# Patient Record
Sex: FEMALE | Race: WHITE | URBAN - METROPOLITAN AREA
[De-identification: names, ages, dates, MRNs, and addresses within clinical notes are randomized per-mention and may not be internally consistent; named-entity substitution may affect disease eponyms.]

---

## 2018-08-22 ENCOUNTER — DOCTOR'S OFFICE (OUTPATIENT)
Dept: URBAN - METROPOLITAN AREA CLINIC 137 | Facility: CLINIC | Age: 58
Setting detail: OPHTHALMOLOGY
End: 2018-08-22
Payer: COMMERCIAL

## 2018-08-22 ENCOUNTER — OPTICAL OFFICE (OUTPATIENT)
Dept: URBAN - METROPOLITAN AREA CLINIC 146 | Facility: CLINIC | Age: 58
Setting detail: OPHTHALMOLOGY
End: 2018-08-22
Payer: COMMERCIAL

## 2018-08-22 ENCOUNTER — RX ONLY (RX ONLY)
Age: 58
End: 2018-08-22

## 2018-08-22 DIAGNOSIS — H52.223: ICD-10-CM

## 2018-08-22 DIAGNOSIS — H52.4: ICD-10-CM

## 2018-08-22 PROCEDURE — V2781 PROGRESSIVE LENS PER LENS: HCPCS | Performed by: OPTOMETRIST

## 2018-08-22 PROCEDURE — V2020 VISION SVCS FRAMES PURCHASES: HCPCS | Performed by: OPTOMETRIST

## 2018-08-22 PROCEDURE — 92015 DETERMINE REFRACTIVE STATE: CPT | Performed by: OPTOMETRIST

## 2018-08-22 PROCEDURE — 92004 COMPRE OPH EXAM NEW PT 1/>: CPT | Performed by: OPTOMETRIST

## 2018-08-22 PROCEDURE — V2744 TINT PHOTOCHROMATIC LENS/ES: HCPCS | Performed by: OPTOMETRIST

## 2018-08-22 ASSESSMENT — REFRACTION_OUTSIDERX
OS_VA3: 20/
OS_VA2: 20/
OS_ADD: +2.25
OS_SPHERE: PLANO
OD_VA2: 20/
OS_VA1: 20/30+
OS_AXIS: 040
OD_SPHERE: PLANO
OD_AXIS: 005
OD_ADD: +2.25
OU_VA: 20/20
OD_CYLINDER: -1.00
OD_VA3: 20/
OD_VA1: 20/20
OS_CYLINDER: -2.00

## 2018-08-22 ASSESSMENT — REFRACTION_CURRENTRX
OD_OVR_VA: 20/
OS_OVR_VA: 20/
OD_OVR_VA: 20/
OS_OVR_VA: 20/
OD_OVR_VA: 20/
OS_OVR_VA: 20/

## 2018-08-22 ASSESSMENT — REFRACTION_AUTOREFRACTION
OS_CYLINDER: +2.00
OD_CYLINDER: +1.25
OS_SPHERE: -0.25
OD_AXIS: 14
OS_AXIS: 41
OD_SPHERE: -0.25

## 2018-08-22 ASSESSMENT — REFRACTION_MANIFEST
OD_VA3: 20/
OS_VA1: 20/
OD_VA1: 20/
OS_VA2: 20/
OS_VA3: 20/
OS_VA2: 20/
OD_VA1: 20/
OD_VA2: 20/
OS_VA1: 20/
OU_VA: 20/
OS_VA3: 20/
OD_VA2: 20/
OD_VA3: 20/
OU_VA: 20/

## 2018-08-22 ASSESSMENT — VISUAL ACUITY
OD_BCVA: 20/50+2
OS_BCVA: 20/30-2

## 2018-08-22 ASSESSMENT — INCREASING TEAR LAKE - SEVERITY SCORE
OS_INC_TEARLAKE: 1+
OD_INC_TEARLAKE: 1+

## 2018-08-22 ASSESSMENT — SUPERFICIAL PUNCTATE KERATITIS (SPK)
OS_SPK: T
OD_SPK: T

## 2018-08-22 ASSESSMENT — CONFRONTATIONAL VISUAL FIELD TEST (CVF)
OD_FINDINGS: FULL
OS_FINDINGS: FULL

## 2018-08-22 ASSESSMENT — SPHEQUIV_DERIVED
OS_SPHEQUIV: 0.75
OD_SPHEQUIV: 0.375

## 2018-08-22 ASSESSMENT — TEAR BREAK UP TIME (TBUT)
OD_TBUT: 2+
OS_TBUT: 2+

## 2018-09-12 ENCOUNTER — DOCTOR'S OFFICE (OUTPATIENT)
Dept: URBAN - METROPOLITAN AREA CLINIC 137 | Facility: CLINIC | Age: 58
Setting detail: OPHTHALMOLOGY
End: 2018-09-12
Payer: MEDICARE

## 2018-09-12 DIAGNOSIS — H52.4: ICD-10-CM

## 2018-09-12 PROCEDURE — NO CHARGE N/C PROFESSIONAL COURTESY: Performed by: OPTOMETRIST

## 2018-09-12 ASSESSMENT — VISUAL ACUITY
OS_BCVA: 20/30-2
OD_BCVA: 20/40+2

## 2018-09-12 ASSESSMENT — REFRACTION_CURRENTRX
OD_SPHERE: -0.75
OD_OVR_VA: 20/
OS_AXIS: 132
OD_CYLINDER: +1.00
OS_CYLINDER: +1.75
OS_OVR_VA: 20/
OD_OVR_VA: 20/
OS_OVR_VA: 20/
OS_OVR_VA: 20/
OS_SPHERE: -1.50
OD_AXIS: 107
OD_OVR_VA: 20/

## 2018-09-12 ASSESSMENT — REFRACTION_MANIFEST
OS_AXIS: 045
OS_VA1: 20/20
OU_VA: 20/20
OD_SPHERE: -0.75
OD_VA2: 20/
OS_SPHERE: -1.25
OS_VA3: 20/
OS_VA2: 20/
OS_VA2: 20/
OU_VA: 20/
OD_VA1: 20/20
OS_VA3: 20/
OD_VA3: 20/
OS_VA1: 20/
OD_CYLINDER: +0.75
OD_AXIS: 045
OS_ADD: +2.25
OD_VA3: 20/
OD_VA1: 20/
OS_CYLINDER: +1.75
OD_ADD: +2.25
OD_VA2: 20/

## 2018-09-12 ASSESSMENT — REFRACTION_AUTOREFRACTION
OS_CYLINDER: +2.00
OS_SPHERE: -0.25
OD_SPHERE: -0.25
OS_AXIS: 41
OD_AXIS: 14
OD_CYLINDER: +1.25

## 2018-09-12 ASSESSMENT — SPHEQUIV_DERIVED
OD_SPHEQUIV: -0.375
OD_SPHEQUIV: 0.375
OS_SPHEQUIV: 0.75
OS_SPHEQUIV: -0.375

## 2018-09-12 ASSESSMENT — CONFRONTATIONAL VISUAL FIELD TEST (CVF)
OD_FINDINGS: FULL
OS_FINDINGS: FULL

## 2018-12-07 ENCOUNTER — DOCTOR'S OFFICE (OUTPATIENT)
Dept: URBAN - METROPOLITAN AREA CLINIC 137 | Facility: CLINIC | Age: 58
Setting detail: OPHTHALMOLOGY
End: 2018-12-07

## 2018-12-07 DIAGNOSIS — H52.4: ICD-10-CM

## 2018-12-07 PROCEDURE — NCRX N/C GLASSES RX: Performed by: OPTOMETRIST

## 2018-12-07 ASSESSMENT — REFRACTION_MANIFEST
OU_VA: 20/
OS_AXIS: 135
OD_ADD: +2.25
OD_VA2: 20/
OU_VA: 20/20
OD_AXIS: 135
OS_ADD: +2.25
OD_VA1: 20/
OS_CYLINDER: -1.75
OD_VA2: 20/
OS_VA1: 20/
OD_CYLINDER: -0.75
OS_VA3: 20/
OS_VA2: 20/
OS_VA1: 20/20
OD_VA1: 20/20
OD_SPHERE: PLANO
OS_VA3: 20/
OS_VA2: 20/
OD_VA3: 20/
OS_SPHERE: +0.50
OD_VA3: 20/

## 2018-12-07 ASSESSMENT — REFRACTION_CURRENTRX
OD_OVR_VA: 20/
OD_AXIS: 017
OS_CYLINDER: -1.75
OD_CYLINDER: -1.00
OD_OVR_VA: 20/
OD_SPHERE: +0.25
OS_OVR_VA: 20/
OS_OVR_VA: 20/
OD_OVR_VA: 20/
OS_OVR_VA: 20/
OS_AXIS: 042
OS_SPHERE: +0.25

## 2018-12-07 ASSESSMENT — SPHEQUIV_DERIVED
OS_SPHEQUIV: 0.125
OD_SPHEQUIV: -0.625
OS_SPHEQUIV: -0.375

## 2018-12-07 ASSESSMENT — REFRACTION_AUTOREFRACTION
OS_CYLINDER: -1.25
OD_SPHERE: -0.25
OD_AXIS: 144
OD_CYLINDER: -0.75
OS_AXIS: 119
OS_SPHERE: +0.75

## 2018-12-07 ASSESSMENT — VISUAL ACUITY
OD_BCVA: 20/30-1
OS_BCVA: 20/20-2

## 2019-01-09 ENCOUNTER — DOCTOR'S OFFICE (OUTPATIENT)
Dept: URBAN - METROPOLITAN AREA CLINIC 137 | Facility: CLINIC | Age: 59
Setting detail: OPHTHALMOLOGY
End: 2019-01-09

## 2019-01-09 DIAGNOSIS — H52.223: ICD-10-CM

## 2019-01-09 PROCEDURE — NCRX N/C GLASSES RX: Performed by: OPTOMETRIST

## 2019-01-09 ASSESSMENT — CONFRONTATIONAL VISUAL FIELD TEST (CVF)
OS_FINDINGS: FULL
OD_FINDINGS: FULL

## 2019-01-09 ASSESSMENT — SPHEQUIV_DERIVED
OS_SPHEQUIV: -0.375
OD_SPHEQUIV: -0.625
OS_SPHEQUIV: 0.125

## 2019-01-09 ASSESSMENT — REFRACTION_MANIFEST
OD_VA3: 20/
OS_VA2: 20/
OS_CYLINDER: -1.75
OD_VA3: 20/
OS_VA1: 20/20
OU_VA: 20/
OS_VA1: 20/
OD_ADD: +2.25
OD_CYLINDER: -0.75
OS_VA2: 20/
OS_VA3: 20/
OU_VA: 20/20
OS_AXIS: 135
OD_VA1: 20/20
OD_AXIS: 135
OD_VA1: 20/
OS_SPHERE: +0.50
OD_VA2: 20/
OS_VA3: 20/
OD_SPHERE: PLANO
OS_ADD: +2.25
OD_VA2: 20/

## 2019-01-09 ASSESSMENT — REFRACTION_CURRENTRX
OS_SPHERE: +0.25
OS_OVR_VA: 20/
OD_OVR_VA: 20/
OS_OVR_VA: 20/
OS_OVR_VA: 20/
OD_CYLINDER: -1.00
OS_CYLINDER: -1.75
OD_OVR_VA: 20/
OD_AXIS: 017
OS_AXIS: 042
OD_SPHERE: +0.25
OD_OVR_VA: 20/

## 2019-01-09 ASSESSMENT — REFRACTION_AUTOREFRACTION
OD_SPHERE: -0.25
OS_AXIS: 119
OD_AXIS: 144
OS_SPHERE: +0.75
OD_CYLINDER: -0.75
OS_CYLINDER: -1.25

## 2019-01-09 ASSESSMENT — VISUAL ACUITY
OD_BCVA: 20/25-1
OS_BCVA: 20/20-1

## 2019-03-06 ENCOUNTER — OPTICAL OFFICE (OUTPATIENT)
Dept: URBAN - METROPOLITAN AREA CLINIC 146 | Facility: CLINIC | Age: 59
Setting detail: OPHTHALMOLOGY
End: 2019-03-06
Payer: COMMERCIAL

## 2019-03-06 DIAGNOSIS — H52.223: ICD-10-CM

## 2019-03-06 PROCEDURE — V2762 POLARIZATION, ANY LENS: HCPCS | Performed by: OPTOMETRIST

## 2019-03-06 PROCEDURE — V2020 VISION SVCS FRAMES PURCHASES: HCPCS | Performed by: OPTOMETRIST

## 2019-03-06 PROCEDURE — V2103 SPHEROCYLINDR 4.00D/12-2.00D: HCPCS | Performed by: OPTOMETRIST

## 2020-06-01 ENCOUNTER — TELEPHONE (OUTPATIENT)
Dept: FAMILY MEDICINE CLINIC | Facility: CLINIC | Age: 60
End: 2020-06-01

## 2020-06-01 DIAGNOSIS — Z20.822 EXPOSURE TO COVID-19 VIRUS: Primary | ICD-10-CM

## 2020-06-03 LAB — SARS-COV-2 IGG SERPL QL IA: NEGATIVE

## 2020-08-13 ENCOUNTER — DOCTOR'S OFFICE (OUTPATIENT)
Dept: URBAN - METROPOLITAN AREA CLINIC 137 | Facility: CLINIC | Age: 60
Setting detail: OPHTHALMOLOGY
End: 2020-08-13
Payer: MEDICARE

## 2020-08-13 DIAGNOSIS — H43.811: ICD-10-CM

## 2020-08-13 PROCEDURE — 92014 COMPRE OPH EXAM EST PT 1/>: CPT | Performed by: OPTOMETRIST

## 2020-08-13 ASSESSMENT — REFRACTION_MANIFEST
OS_VA1: 20/20
OD_VA1: 20/20
OU_VA: 20/20
OD_SPHERE: PLANO
OS_AXIS: 135
OS_CYLINDER: -1.75
OD_AXIS: 135
OD_ADD: +2.25
OD_CYLINDER: -0.75
OS_ADD: +2.25
OS_SPHERE: +0.50

## 2020-08-13 ASSESSMENT — TEAR BREAK UP TIME (TBUT)
OS_TBUT: 2+
OD_TBUT: 2+

## 2020-08-13 ASSESSMENT — SUPERFICIAL PUNCTATE KERATITIS (SPK)
OD_SPK: T
OS_SPK: T

## 2020-08-13 ASSESSMENT — REFRACTION_CURRENTRX
OS_SPHERE: +0.25
OD_CYLINDER: -1.00
OD_SPHERE: +0.25
OD_AXIS: 017
OS_CYLINDER: -1.75
OD_OVR_VA: 20/
OS_AXIS: 042
OS_OVR_VA: 20/

## 2020-08-13 ASSESSMENT — SPHEQUIV_DERIVED
OS_SPHEQUIV: -0.375
OD_SPHEQUIV: -0.625
OS_SPHEQUIV: 0.125

## 2020-08-13 ASSESSMENT — CONFRONTATIONAL VISUAL FIELD TEST (CVF)
OD_FINDINGS: FULL
OS_FINDINGS: FULL

## 2020-08-13 ASSESSMENT — VISUAL ACUITY
OS_BCVA: 20/30-1
OD_BCVA: 20/30

## 2020-08-13 ASSESSMENT — REFRACTION_AUTOREFRACTION
OS_SPHERE: +0.75
OD_CYLINDER: -0.75
OS_CYLINDER: -1.25
OD_SPHERE: -0.25
OD_AXIS: 144
OS_AXIS: 119

## 2020-08-13 ASSESSMENT — INCREASING TEAR LAKE - SEVERITY SCORE
OD_INC_TEARLAKE: 1+
OS_INC_TEARLAKE: 1+

## 2020-09-08 ENCOUNTER — OPTICAL OFFICE (OUTPATIENT)
Dept: URBAN - METROPOLITAN AREA CLINIC 146 | Facility: CLINIC | Age: 60
Setting detail: OPHTHALMOLOGY
End: 2020-09-08

## 2020-09-08 ENCOUNTER — DOCTOR'S OFFICE (OUTPATIENT)
Dept: URBAN - METROPOLITAN AREA CLINIC 137 | Facility: CLINIC | Age: 60
Setting detail: OPHTHALMOLOGY
End: 2020-09-08
Payer: COMMERCIAL

## 2020-09-08 DIAGNOSIS — H52.223: ICD-10-CM

## 2020-09-08 DIAGNOSIS — H52.03: ICD-10-CM

## 2020-09-08 PROCEDURE — V2104 SPHEROCYLINDR 4.00D/2.12-4D: HCPCS | Performed by: OPTOMETRIST

## 2020-09-08 PROCEDURE — V2103 SPHEROCYLINDR 4.00D/12-2.00D: HCPCS | Performed by: OPTOMETRIST

## 2020-09-08 PROCEDURE — V2020 VISION SVCS FRAMES PURCHASES: HCPCS | Performed by: OPTOMETRIST

## 2020-09-08 PROCEDURE — V2762 POLARIZATION, ANY LENS: HCPCS | Performed by: OPTOMETRIST

## 2020-09-08 PROCEDURE — 92014 COMPRE OPH EXAM EST PT 1/>: CPT | Performed by: OPTOMETRIST

## 2020-09-08 ASSESSMENT — VISUAL ACUITY
OD_BCVA: 20/30
OS_BCVA: 20/30

## 2020-09-08 ASSESSMENT — REFRACTION_MANIFEST
OD_CYLINDER: -2.25
OD_AXIS: 105
OS_CYLINDER: -1.75
OS_VA1: 20/20
OS_ADD: +2.00
OD_AXIS: 135
OD_ADD: +2.00
OS_CYLINDER: -1.75
OS_ADD: +2.25
OD_ADD: +2.25
OS_AXIS: 135
OU_VA: 20/20
OS_VA1: 20/20
OS_AXIS: 115
OD_CYLINDER: -0.75
OD_SPHERE: +1.25
OD_SPHERE: PLANO
OS_SPHERE: +0.50
OS_SPHERE: +1.25
OD_VA1: 20/20
OD_VA1: 20/20

## 2020-09-08 ASSESSMENT — REFRACTION_CURRENTRX
OS_SPHERE: +0.50
OD_AXIS: 139
OD_SPHERE: SPH
OS_CYLINDER: -2.00
OS_AXIS: 143
OS_OVR_VA: 20/
OD_CYLINDER: -1.00
OD_OVR_VA: 20/

## 2020-09-08 ASSESSMENT — SUPERFICIAL PUNCTATE KERATITIS (SPK)
OS_SPK: T
OD_SPK: T

## 2020-09-08 ASSESSMENT — INCREASING TEAR LAKE - SEVERITY SCORE
OS_INC_TEARLAKE: 1+
OD_INC_TEARLAKE: 1+

## 2020-09-08 ASSESSMENT — SPHEQUIV_DERIVED
OD_SPHEQUIV: 0
OS_SPHEQUIV: 0.25
OS_SPHEQUIV: -0.375
OS_SPHEQUIV: 0.375
OD_SPHEQUIV: 0.125

## 2020-09-08 ASSESSMENT — REFRACTION_AUTOREFRACTION
OS_SPHERE: +1.00
OS_AXIS: 116
OD_AXIS: 106
OD_CYLINDER: -1.00
OS_CYLINDER: -1.50
OD_SPHERE: +0.50

## 2020-09-08 ASSESSMENT — TEAR BREAK UP TIME (TBUT)
OS_TBUT: 2+
OD_TBUT: 2+

## 2020-09-08 ASSESSMENT — CONFRONTATIONAL VISUAL FIELD TEST (CVF)
OD_FINDINGS: FULL
OS_FINDINGS: FULL

## 2020-11-23 ENCOUNTER — DOCTOR'S OFFICE (OUTPATIENT)
Dept: URBAN - METROPOLITAN AREA CLINIC 137 | Facility: CLINIC | Age: 60
Setting detail: OPHTHALMOLOGY
End: 2020-11-23

## 2020-11-23 DIAGNOSIS — H52.223: ICD-10-CM

## 2020-11-23 DIAGNOSIS — H52.03: ICD-10-CM

## 2020-11-23 PROCEDURE — NCRX N/C GLASSES RX: Performed by: OPTOMETRIST

## 2020-11-23 ASSESSMENT — CONFRONTATIONAL VISUAL FIELD TEST (CVF)
OS_FINDINGS: FULL
OD_FINDINGS: FULL

## 2020-11-30 PROBLEM — H43.811 POSTERIOR VITREOUS DETACHMENT; RIGHT EYE: Status: ACTIVE | Noted: 2020-08-13

## 2020-11-30 PROBLEM — H27.8 PSEUDOPHAKIA ; BOTH EYES: Status: ACTIVE | Noted: 2018-08-22

## 2020-11-30 PROBLEM — H52.03 HYPEROPIA; BOTH EYES: Status: ACTIVE | Noted: 2020-09-08

## 2020-11-30 PROBLEM — H27.9 PSEUDOPHAKIA ; BOTH EYES: Status: ACTIVE | Noted: 2018-08-22

## 2020-11-30 PROBLEM — H52.223 ASTIGMATISM, REGULAR; BOTH EYES: Status: ACTIVE | Noted: 2018-08-22

## 2020-11-30 PROBLEM — H04.123 DRY EYE; BOTH EYES: Status: ACTIVE | Noted: 2018-08-22

## 2020-11-30 ASSESSMENT — SPHEQUIV_DERIVED
OD_SPHEQUIV: 0
OS_SPHEQUIV: 0.375
OD_SPHEQUIV: 0.125
OD_SPHEQUIV: 0.5
OS_SPHEQUIV: 0.375
OS_SPHEQUIV: 0.375

## 2020-11-30 ASSESSMENT — REFRACTION_MANIFEST
OD_ADD: +2.00
OU_VA: 20/20
OS_VA1: 20/20
OD_CYLINDER: -2.25
OS_SPHERE: +1.25
OS_CYLINDER: -1.75
OD_AXIS: 105
OD_VA1: 20/20
OS_ADD: +2.25
OS_AXIS: 115
OD_CYLINDER: -1.50
OS_SPHERE: +1.25
OS_CYLINDER: -1.75
OS_AXIS: 115
OD_VA1: 20/20
OD_ADD: +2.25
OD_AXIS: 100
OS_ADD: +2.00
OD_SPHERE: +1.25
OD_SPHERE: +1.25
OS_VA1: 20/20

## 2020-11-30 ASSESSMENT — REFRACTION_CURRENTRX
OS_SPHERE: +1.25
OD_OVR_VA: 20/
OD_SPHERE: +1.25
OD_VPRISM_DIRECTION: SV
OS_OVR_VA: 20/
OS_CYLINDER: -1.75
OS_VPRISM_DIRECTION: SV
OD_CYLINDER: -2.25
OD_AXIS: 109
OS_AXIS: 112

## 2020-11-30 ASSESSMENT — VISUAL ACUITY
OD_BCVA: 20/25
OS_BCVA: 20/20

## 2020-11-30 ASSESSMENT — REFRACTION_AUTOREFRACTION
OD_SPHERE: +0.50
OD_AXIS: 116
OS_AXIS: 114
OS_SPHERE: +1.00
OS_CYLINDER: -1.25
OD_CYLINDER: -1.00

## 2020-12-11 ENCOUNTER — OPTICAL OFFICE (OUTPATIENT)
Dept: URBAN - METROPOLITAN AREA CLINIC 146 | Facility: CLINIC | Age: 60
Setting detail: OPHTHALMOLOGY
End: 2020-12-11
Payer: COMMERCIAL

## 2020-12-11 DIAGNOSIS — H52.223: ICD-10-CM

## 2020-12-11 PROCEDURE — V2020 VISION SVCS FRAMES PURCHASES: HCPCS | Performed by: OPTOMETRIST

## 2020-12-11 PROCEDURE — V2103 SPHEROCYLINDR 4.00D/12-2.00D: HCPCS | Performed by: OPTOMETRIST

## 2020-12-11 PROCEDURE — V2744 TINT PHOTOCHROMATIC LENS/ES: HCPCS | Performed by: OPTOMETRIST

## 2021-01-23 DIAGNOSIS — Z23 ENCOUNTER FOR IMMUNIZATION: ICD-10-CM

## 2021-01-28 ENCOUNTER — IMMUNIZATIONS (OUTPATIENT)
Dept: FAMILY MEDICINE CLINIC | Facility: HOSPITAL | Age: 61
End: 2021-01-28

## 2021-01-28 DIAGNOSIS — Z23 ENCOUNTER FOR IMMUNIZATION: Primary | ICD-10-CM

## 2021-01-28 PROCEDURE — 91301 SARS-COV-2 / COVID-19 MRNA VACCINE (MODERNA) 100 MCG: CPT | Performed by: SURGERY

## 2021-01-28 PROCEDURE — 0011A SARS-COV-2 / COVID-19 MRNA VACCINE (MODERNA) 100 MCG: CPT | Performed by: SURGERY

## 2021-02-24 ENCOUNTER — IMMUNIZATIONS (OUTPATIENT)
Dept: FAMILY MEDICINE CLINIC | Facility: HOSPITAL | Age: 61
End: 2021-02-24

## 2021-02-24 DIAGNOSIS — Z23 ENCOUNTER FOR IMMUNIZATION: Primary | ICD-10-CM

## 2021-02-24 PROCEDURE — 91301 SARS-COV-2 / COVID-19 MRNA VACCINE (MODERNA) 100 MCG: CPT

## 2021-02-24 PROCEDURE — 0012A SARS-COV-2 / COVID-19 MRNA VACCINE (MODERNA) 100 MCG: CPT

## 2021-06-17 ENCOUNTER — EVALUATION (OUTPATIENT)
Dept: PHYSICAL THERAPY | Facility: CLINIC | Age: 61
End: 2021-06-17
Payer: COMMERCIAL

## 2021-06-17 DIAGNOSIS — M79.18 MYOFASCIAL PAIN DYSFUNCTION SYNDROME: Primary | ICD-10-CM

## 2021-06-17 DIAGNOSIS — M54.2 NECK PAIN: ICD-10-CM

## 2021-06-17 PROCEDURE — 97162 PT EVAL MOD COMPLEX 30 MIN: CPT | Performed by: PHYSICAL THERAPIST

## 2021-06-17 PROCEDURE — 97530 THERAPEUTIC ACTIVITIES: CPT | Performed by: PHYSICAL THERAPIST
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
## 2021-06-21 ENCOUNTER — OFFICE VISIT (OUTPATIENT)
Dept: PHYSICAL THERAPY | Facility: CLINIC | Age: 61
End: 2021-06-21
Payer: COMMERCIAL

## 2021-06-21 DIAGNOSIS — M79.18 MYOFASCIAL PAIN DYSFUNCTION SYNDROME: Primary | ICD-10-CM

## 2021-06-21 DIAGNOSIS — M54.2 NECK PAIN: ICD-10-CM

## 2021-06-21 PROCEDURE — 97110 THERAPEUTIC EXERCISES: CPT | Performed by: PHYSICAL THERAPIST

## 2021-06-21 PROCEDURE — 97140 MANUAL THERAPY 1/> REGIONS: CPT | Performed by: PHYSICAL THERAPIST

## 2021-06-21 PROCEDURE — 97530 THERAPEUTIC ACTIVITIES: CPT | Performed by: PHYSICAL THERAPIST

## 2021-06-21 NOTE — PROGRESS NOTES
Daily Note     Today's date: 2021  Patient name: Shamar Peng  : 1960  MRN: [id number]  Referring provider: Jb Bates DMD  Dx:   Encounter Diagnosis     ICD-10-CM    1  Myofascial pain dysfunction syndrome  M79 18    2  Neck pain  M54 2                   Subjective: Pt states that she has been using the one position she was taught whenever she feels herself clenching and that has been helping to relieve her pain  She reports that her pain level is 2/10  Objective: See treatment diary below      Assessment: Tolerated treatment well  Was able to perform all exercises noted today without increased pain  Discussed sleeping positions and ways that she could adjust   She was given HEP print out, and all questions/concerns addressed  Plan: Continue per plan of care     Diagnosis: Cervical dysfunction with headaches and TMJ dysfunction with myofascial pain R>L   Precautions: chronicity    Goals: Improve cervical ROM, posture, scapular strength   Manual Therapy 21      [facility]  HJS, PT                                      Exercise Diary         Therapeutic Exercise        Open packed position  1 min      Rocobada x6  1) tongue on roof of mouth + 6 breaths  2) tongue on roof of mouth, controlled opening  3)  Opening iso, closing iso, lat dev iso 6 sec holds ea  4)  CS retraction nods   5) Lower cervical retractions  6) scapular retraction/depression    6x                                              Neuromuscular Re-education        CS stabilizer        CS ret with OP  20x      TB rows/ext  GTB 20x ea      Posture reviewed for work                                        Therapeutic Activities        Pt education HEP, open packed position, sleeping positions, anatomy     Sleep postures,  Theracane, and HEP              Modalities        MHP/CP PRN

## 2021-06-28 ENCOUNTER — OFFICE VISIT (OUTPATIENT)
Dept: PHYSICAL THERAPY | Facility: CLINIC | Age: 61
End: 2021-06-28
Payer: COMMERCIAL

## 2021-06-28 DIAGNOSIS — M54.2 NECK PAIN: ICD-10-CM

## 2021-06-28 DIAGNOSIS — M79.18 MYOFASCIAL PAIN DYSFUNCTION SYNDROME: Primary | ICD-10-CM

## 2021-06-28 PROCEDURE — 97530 THERAPEUTIC ACTIVITIES: CPT | Performed by: PHYSICAL THERAPIST

## 2021-06-28 PROCEDURE — 97140 MANUAL THERAPY 1/> REGIONS: CPT | Performed by: PHYSICAL THERAPIST

## 2021-06-28 NOTE — PROGRESS NOTES
Daily Note     Today's date: 2021  Patient name: Shellie Isaac  : 1960  MRN: [id number]  Referring provider: Chuck Pacheco DMD  Dx:   Encounter Diagnosis     ICD-10-CM    1  Myofascial pain dysfunction syndrome  M79 18    2  Neck pain  M54 2                   Subjective: Patient reports that she fell while hiking over the weekend because her shoes got caught up together  She reports she must have neha her neck during the fall, because she has been having a lot of pain since  Objective: See treatment diary below      Assessment: Tolerated treatment well  We reviewed exercises in depth, patient took notes so that she can remember each exercise  Print outs provided  All questions/concerns were addressed  Patient was educated on insurance coverage and self pay  Patient was educated on how to reproduce manual therapy at home  Plan: Continue per plan of care  Diagnosis: Cervical dysfunction with headaches and TMJ dysfunction with myofascial pain R>L   Precautions: chronicity    Goals: Improve cervical ROM, posture, scapular strength   Manual Therapy 21     [facility]  HJS, PT      C1-2 lateral mobs   HJS, PT with SB and in neutral     SOR   HJS, PT                     Exercise Diary         Therapeutic Exercise        Open packed position  1 min      Rocobada x6  1) tongue on roof of mouth + 6 breaths  2) tongue on roof of mouth, controlled opening  3)  Opening iso, closing iso, lat dev iso 6 sec holds ea  4)  CS retraction nods   5) Lower cervical retractions  6) scapular retraction/depression    6x                                              Neuromuscular Re-education        CS stabilizer        CS ret with OP  20x      TB rows/ext  GTB 20x ea      Posture reviewed for work                                        Therapeutic Activities        Pt education HEP, open packed position, sleeping positions, anatomy     Sleep postures,  Theracane, and HEP HEP reviewed at length               Modalities        MHP/CP PRN

## 2021-07-15 NOTE — PROGRESS NOTES
Patient states that she feels like she can manage on her own at this time  She was educated on where PT would be covered should she need it  She will be d/c for now secondary to self pay  Will call back if she needs us

## 2022-06-02 PROBLEM — K62.89 ANAL PAIN: Status: ACTIVE | Noted: 2022-06-02
